# Patient Record
Sex: MALE | Race: WHITE | NOT HISPANIC OR LATINO | Employment: FULL TIME | URBAN - METROPOLITAN AREA
[De-identification: names, ages, dates, MRNs, and addresses within clinical notes are randomized per-mention and may not be internally consistent; named-entity substitution may affect disease eponyms.]

---

## 2020-05-13 ENCOUNTER — TELEPHONE (OUTPATIENT)
Dept: SCHEDULING | Facility: IMAGING CENTER | Age: 25
End: 2020-05-13

## 2020-05-18 ENCOUNTER — TELEMEDICINE (OUTPATIENT)
Dept: MEDICAL GROUP | Facility: MEDICAL CENTER | Age: 25
End: 2020-05-18
Payer: COMMERCIAL

## 2020-05-18 VITALS — HEIGHT: 68 IN | TEMPERATURE: 97.6 F | BODY MASS INDEX: 23.49 KG/M2 | WEIGHT: 155 LBS

## 2020-05-18 DIAGNOSIS — Z01.84 IMMUNITY TO VARICELLA DETERMINED BY SEROLOGIC TEST: ICD-10-CM

## 2020-05-18 DIAGNOSIS — Z82.49 FAMILY HISTORY OF HEART DISEASE: ICD-10-CM

## 2020-05-18 DIAGNOSIS — Z11.1 SCREENING-PULMONARY TB: ICD-10-CM

## 2020-05-18 PROCEDURE — 99203 OFFICE O/P NEW LOW 30 MIN: CPT | Mod: 95,CR | Performed by: FAMILY MEDICINE

## 2020-05-18 SDOH — HEALTH STABILITY: MENTAL HEALTH: HOW MANY STANDARD DRINKS CONTAINING ALCOHOL DO YOU HAVE ON A TYPICAL DAY?: 1 OR 2

## 2020-05-18 SDOH — HEALTH STABILITY: MENTAL HEALTH: HOW OFTEN DO YOU HAVE A DRINK CONTAINING ALCOHOL?: 2-3 TIMES A WEEK

## 2020-05-18 NOTE — ASSESSMENT & PLAN NOTE
The patient will be attending [employer] school at [employer] in the fall and is required to complete screening for TB.

## 2020-05-18 NOTE — PROGRESS NOTES
Telemedicine Visit: New Patient     This encounter was conducted via [employer].   Verbal consent was obtained. Patient's identity was verified.    Subjective:     CC:   Gregory Soler is a 25 y.o. male presenting to establish care. He will be moving to [place] in the [place] as he will be attending grad school at [employer]. He presents for lab tests per below. He will be establishing with a new PCP in [place]. He prefers to defer TDAP and HPV vaccinations until he establishes in [place].     Immunity to varicella determined by serologic test  The patient will be attending grad school at [employer] in the fall and is required to show proof of immunity to varicella as he reports varicella infection as a child and did not receive the varicella vaccination.    Screening-pulmonary TB  The patient will be attending [employer] school at [employer] in the fall and is required to complete screening for TB.      ROS  See HPI  Constitutional: Negative for fever, chills and malaise/fatigue.   HENT: Negative for congestion.    Eyes: Negative for pain.   Respiratory: Negative for cough and shortness of breath.    Cardiovascular: Negative for leg swelling.   Gastrointestinal: Negative for nausea, vomiting, abdominal pain and diarrhea.   Genitourinary: Negative for dysuria and hematuria.   Skin: Negative for rash.   Neurological: Negative for dizziness, focal weakness and headaches.   Endo/Heme/Allergies: Does not bruise/bleed easily.   Psychiatric/Behavioral: Negative for depression.  The patient is not nervous/anxious.      Allergies   Allergen Reactions   • Zithromax  [Azithromycin] Hives       Current medicines (including changes today)  No current outpatient medications on file.     No current facility-administered medications for this visit.        He  has no past medical history on file.  He  has no past surgical history on file.      Family History   Problem Relation Age of Onset   • Heart Disease Maternal Grandfather    •  "Diabetes Maternal Grandfather      Family Status   Relation Name Status   • Mo  Alive   • Fa  Alive   • MGFa  (Not Specified)       Patient Active Problem List    Diagnosis Date Noted   • Screening-pulmonary TB 05/18/2020   • Immunity to varicella determined by serologic test 05/18/2020          Objective:   Vitals obtained by patient:  Temp 36.4 °C (97.6 °F) (Oral)   Ht 1.727 m (5' 8\")   Wt 70.3 kg (155 lb)   BMI 23.57 kg/m²     Physical Exam:  Constitutional: Alert, no distress, well-groomed.  Skin: No rashes in visible areas.  Eye: Round. Conjunctiva clear, lids normal. No icterus.   ENMT: Lips pink without lesions, good dentition, moist mucous membranes. Phonation normal.  CV: Pulse as reported by patient  Respiratory: Unlabored respiratory effort, no cough or audible wheeze  Psych: Alert and oriented x3, normal affect and mood.       Assessment and Plan:   The following treatment plan was discussed:     1. Immunity to varicella determined by serologic test  The patient will be attending grad school at [employer] in the fall and is required to demonstrate proof of immunity to varicella.  - VARICELLA ZOSTER IGG AB; Future    2. Screening-pulmonary TB  The patient will be attending grad school at [employer] in the fall and is required to complete TB screening  - Quantiferon Gold TB (PPD); Future    3. Family history of heart disease  - Comp Metabolic Panel; Future  - Lipid Profile; Future        Follow-up: Return as needed; The patient will be establishing with new PCP in [facility]     Please note this dictation was created using voice recognition software. I have made every reasonable attempt to correct obvious errors, but I expect there may be errors of grammar, and possibly content, that I did not discover before finalizing the note.   "

## 2020-05-18 NOTE — ASSESSMENT & PLAN NOTE
The patient will be attending grad school at [employer] in the fall and is required to show proof of immunity to varicella as he reports varicella infection as a child and did not receive the varicella vaccination.

## 2020-05-20 ENCOUNTER — HOSPITAL ENCOUNTER (OUTPATIENT)
Dept: LAB | Facility: MEDICAL CENTER | Age: 25
End: 2020-05-20
Attending: FAMILY MEDICINE
Payer: COMMERCIAL

## 2020-05-20 DIAGNOSIS — Z01.84 IMMUNITY TO VARICELLA DETERMINED BY SEROLOGIC TEST: ICD-10-CM

## 2020-05-20 DIAGNOSIS — Z11.1 SCREENING-PULMONARY TB: ICD-10-CM

## 2020-05-20 PROCEDURE — 86787 VARICELLA-ZOSTER ANTIBODY: CPT

## 2020-05-20 PROCEDURE — 36415 COLL VENOUS BLD VENIPUNCTURE: CPT

## 2020-05-20 PROCEDURE — 86480 TB TEST CELL IMMUN MEASURE: CPT

## 2020-05-21 LAB — VZV IGG SER IA-ACNC: 2.25

## 2020-05-22 LAB
GAMMA INTERFERON BACKGROUND BLD IA-ACNC: 0.03 IU/ML
M TB IFN-G BLD-IMP: NEGATIVE
M TB IFN-G CD4+ BCKGRND COR BLD-ACNC: 0.03 IU/ML
MITOGEN IGNF BCKGRD COR BLD-ACNC: >10 IU/ML
QFT TB2 - NIL TBQ2: 0.01 IU/ML